# Patient Record
Sex: FEMALE | Race: WHITE | NOT HISPANIC OR LATINO | URBAN - METROPOLITAN AREA
[De-identification: names, ages, dates, MRNs, and addresses within clinical notes are randomized per-mention and may not be internally consistent; named-entity substitution may affect disease eponyms.]

---

## 2023-02-18 PROBLEM — D64.9 ANEMIA: Status: ACTIVE | Noted: 2023-02-18

## 2023-02-18 PROBLEM — R09.81 NASAL CONGESTION: Status: ACTIVE | Noted: 2023-02-18

## 2023-02-18 PROBLEM — R39.15 URINARY URGENCY: Status: ACTIVE | Noted: 2023-02-18

## 2023-02-18 PROBLEM — F41.9 ANXIETY: Status: ACTIVE | Noted: 2023-02-18

## 2023-02-18 PROBLEM — J01.90 ACUTE SINUSITIS: Status: ACTIVE | Noted: 2023-02-18

## 2023-02-18 PROBLEM — J20.9 ACUTE BRONCHITIS: Status: ACTIVE | Noted: 2023-02-18

## 2023-02-18 PROBLEM — K59.00 CONSTIPATION: Status: ACTIVE | Noted: 2023-02-18

## 2023-02-18 PROBLEM — R35.0 URINARY FREQUENCY: Status: ACTIVE | Noted: 2023-02-18

## 2023-02-18 PROBLEM — J02.9 SORE THROAT: Status: ACTIVE | Noted: 2023-02-18

## 2023-02-18 PROBLEM — N39.0 ACUTE UTI: Status: ACTIVE | Noted: 2023-02-18

## 2023-02-18 PROBLEM — B34.9 VIRAL ILLNESS: Status: ACTIVE | Noted: 2023-02-18

## 2023-02-18 PROBLEM — F32.9 MAJOR DEPRESSION, CHRONIC: Status: ACTIVE | Noted: 2023-02-18

## 2023-02-18 PROBLEM — E55.9 MILD VITAMIN D DEFICIENCY: Status: ACTIVE | Noted: 2023-02-18

## 2023-02-18 PROBLEM — F43.10 PTSD (POST-TRAUMATIC STRESS DISORDER): Status: ACTIVE | Noted: 2023-02-18

## 2023-02-18 PROBLEM — N94.6 DYSMENORRHEA: Status: ACTIVE | Noted: 2023-02-18

## 2023-02-18 RX ORDER — CITALOPRAM 40 MG/1
40 TABLET, FILM COATED ORAL DAILY
COMMUNITY
End: 2023-03-07 | Stop reason: SDUPTHER

## 2023-03-06 ASSESSMENT — ENCOUNTER SYMPTOMS
DIARRHEA: 0
HEMATURIA: 0
ABDOMINAL DISTENTION: 0
COLOR CHANGE: 0
FREQUENCY: 0
HEADACHES: 0
POLYPHAGIA: 0
EYE DISCHARGE: 0
POLYDIPSIA: 0
NAUSEA: 0
ABDOMINAL PAIN: 0
CHOKING: 0
MYALGIAS: 0
FACIAL SWELLING: 0
DIFFICULTY URINATING: 0
COUGH: 0
CHILLS: 0
APPETITE CHANGE: 0
ANAL BLEEDING: 0
NERVOUS/ANXIOUS: 0
CONFUSION: 0
PALPITATIONS: 0
SLEEP DISTURBANCE: 0
SORE THROAT: 0
JOINT SWELLING: 0
ARTHRALGIAS: 0
EYE PAIN: 0
TREMORS: 0
NUMBNESS: 0
DIZZINESS: 0
FATIGUE: 0
WEAKNESS: 0
VOMITING: 0
CHEST TIGHTNESS: 0
SHORTNESS OF BREATH: 0
WHEEZING: 0
FEVER: 0
CONSTIPATION: 0

## 2023-03-07 ENCOUNTER — APPOINTMENT (OUTPATIENT)
Dept: PRIMARY CARE | Facility: CLINIC | Age: 23
End: 2023-03-07
Payer: COMMERCIAL

## 2023-03-07 ENCOUNTER — TELEMEDICINE (OUTPATIENT)
Dept: PRIMARY CARE | Facility: CLINIC | Age: 23
End: 2023-03-07
Payer: COMMERCIAL

## 2023-03-07 DIAGNOSIS — F41.9 ANXIETY: ICD-10-CM

## 2023-03-07 DIAGNOSIS — F32.9 MAJOR DEPRESSION, CHRONIC: Primary | ICD-10-CM

## 2023-03-07 PROCEDURE — 99213 OFFICE O/P EST LOW 20 MIN: CPT | Performed by: PHYSICIAN ASSISTANT

## 2023-03-07 RX ORDER — CITALOPRAM 40 MG/1
40 TABLET, FILM COATED ORAL DAILY
Qty: 90 TABLET | Refills: 3 | Status: SHIPPED | OUTPATIENT
Start: 2023-03-07 | End: 2023-06-01 | Stop reason: ALTCHOICE

## 2023-03-07 RX ORDER — KETOCONAZOLE 20 MG/ML
SHAMPOO, SUSPENSION TOPICAL
COMMUNITY
Start: 2022-08-30

## 2023-03-07 RX ORDER — FERROUS SULFATE 325(65) MG
1 TABLET ORAL DAILY
COMMUNITY
Start: 2020-12-04

## 2023-03-07 NOTE — PATIENT INSTRUCTIONS
Anxiety, depression, PTSD  in remission  Failed buspirone due to drowsiness  Continue Celexa 40 mg once daily  Follow-up with psychology at [facility] in [place] once a week or twice a month      Anemia  Continue iron supplement     Off of birth control pill  Follow-up with your gynecologist as scheduled

## 2023-03-07 NOTE — PROGRESS NOTES
Subjective   Patient ID: Laura Riggins is a 22 y.o. female with a history of anxiety, depression, PTSD, and anemia who presents for follow up.     Virtual or Telephone Consent    An interactive audio and video telecommunication system which permits real time communications between the patient (at the originating site) and provider (at the distant site) was utilized to provide this telehealth service.   Verbal consent was requested and obtained from Laura Riggins on this date, 03/07/23 for a telehealth visit.         HPI The patient's depression is well controlled with citalopram.  Denies feeling down or sad.  Does not feel hopeless or helpless.  There is no suicidal or homicidal ideations.  Stated that she is doing a lot better since she is on citalopram 40 mg.  She follows with psychology once a week or twice a month depends on her schedule.  She continues iron supplement daily.  Denies shortness of breath or fatigue.    Review of Systems   Constitutional:  Negative for appetite change, chills, fatigue and fever.   HENT:  Negative for congestion, ear pain, facial swelling, hearing loss, nosebleeds and sore throat.    Eyes:  Negative for pain, discharge and visual disturbance.   Respiratory:  Negative for cough, choking, chest tightness, shortness of breath and wheezing.    Cardiovascular:  Negative for chest pain, palpitations and leg swelling.   Gastrointestinal:  Negative for abdominal distention, abdominal pain, anal bleeding, constipation, diarrhea, nausea and vomiting.   Endocrine: Negative for cold intolerance, heat intolerance, polydipsia, polyphagia and polyuria.   Genitourinary:  Negative for difficulty urinating, frequency, hematuria and urgency.   Musculoskeletal:  Negative for arthralgias, gait problem, joint swelling and myalgias.   Skin:  Negative for color change and rash.   Neurological:  Negative for dizziness, tremors, syncope, weakness, numbness and headaches.   Psychiatric/Behavioral:  Negative for  behavioral problems, confusion, sleep disturbance and suicidal ideas. The patient is not nervous/anxious.        Objective   There were no vitals taken for this visit.    Physical Exam  Constitutional:       General: She is not in acute distress.     Appearance: Normal appearance.   HENT:      Head: Normocephalic and atraumatic.      Nose: Nose normal.   Eyes:      Extraocular Movements: Extraocular movements intact.      Conjunctiva/sclera: Conjunctivae normal.      Pupils: Pupils are equal, round, and reactive to light.   Cardiovascular:      Rate and Rhythm: Normal rate and regular rhythm.      Pulses: Normal pulses.      Heart sounds: Normal heart sounds.   Pulmonary:      Effort: Pulmonary effort is normal.      Breath sounds: Normal breath sounds.   Abdominal:      General: Bowel sounds are normal.      Palpations: Abdomen is soft.   Musculoskeletal:         General: Normal range of motion.      Cervical back: Normal range of motion and neck supple.   Neurological:      General: No focal deficit present.      Mental Status: She is alert and oriented to person, place, and time.   Psychiatric:         Mood and Affect: Mood normal.         Behavior: Behavior normal.         Thought Content: Thought content normal.         Judgment: Judgment normal.       Assessment/Plan       Anxiety, depression, PTSD  in remission  Failed buspirone due to drowsiness  Continue Celexa 40 mg once daily  Follow-up with psychology at [facility] in [place] once a week or twice a month      Anemia  Continue iron supplement     Off of birth control pill  Follow-up with your gynecologist as scheduled    Follow-up in 6 months or sooner if needed

## 2023-05-11 ENCOUNTER — TELEMEDICINE (OUTPATIENT)
Dept: PRIMARY CARE | Facility: CLINIC | Age: 23
End: 2023-05-11
Payer: COMMERCIAL

## 2023-05-11 DIAGNOSIS — F41.9 ANXIETY: Primary | ICD-10-CM

## 2023-05-11 DIAGNOSIS — F32.9 MAJOR DEPRESSION, CHRONIC: ICD-10-CM

## 2023-05-11 PROCEDURE — 99213 OFFICE O/P EST LOW 20 MIN: CPT | Performed by: PHYSICIAN ASSISTANT

## 2023-05-11 RX ORDER — BUPROPION HYDROCHLORIDE 75 MG/1
75 TABLET ORAL 2 TIMES DAILY
Qty: 60 TABLET | Refills: 1 | Status: SHIPPED | OUTPATIENT
Start: 2023-05-11 | End: 2023-06-01 | Stop reason: ALTCHOICE

## 2023-05-11 ASSESSMENT — ENCOUNTER SYMPTOMS
DIARRHEA: 0
CONSTIPATION: 0
HEMATURIA: 0
JOINT SWELLING: 0
DIFFICULTY URINATING: 0
ABDOMINAL PAIN: 0
CONFUSION: 0
SLEEP DISTURBANCE: 0
NUMBNESS: 0
POLYDIPSIA: 0
APPETITE CHANGE: 0
ABDOMINAL DISTENTION: 0
NERVOUS/ANXIOUS: 0
ANAL BLEEDING: 0
CHOKING: 0
COLOR CHANGE: 0
PALPITATIONS: 0
ARTHRALGIAS: 0
FEVER: 0
CHEST TIGHTNESS: 0
TREMORS: 0
COUGH: 0
HEADACHES: 0
WEAKNESS: 0
POLYPHAGIA: 0
FREQUENCY: 0
CHILLS: 0
DIZZINESS: 0
VOMITING: 0
WHEEZING: 0
NAUSEA: 0
SHORTNESS OF BREATH: 0
FATIGUE: 0
SORE THROAT: 0
EYE DISCHARGE: 0
MYALGIAS: 0
EYE PAIN: 0
FACIAL SWELLING: 0

## 2023-05-11 NOTE — PROGRESS NOTES
Subjective   Patient ID: Laura Riggins is a 22 y.o. female with a history of anxiety, depression, PTSD, and anemia complaining of  weight gain.    Virtual or Telephone Consent    An interactive audio and video telecommunication system which permits real time communications between the patient (at the originating site) and provider (at the distant site) was utilized to provide this telehealth service.   Verbal consent was requested and obtained from Laura Riggins on this date, 05/11/23 for a telehealth visit.         HPI The patient is complaining of weight gain since citalopram was increased to 40 mg.  She tries to eat healthy and exercises but unable to lose weight.  Her mood is stable.  No anxiety.  Sleeps well through the night.    Review of Systems   Constitutional:  Negative for appetite change, chills, fatigue and fever.   HENT:  Negative for congestion, ear pain, facial swelling, hearing loss, nosebleeds and sore throat.    Eyes:  Negative for pain, discharge and visual disturbance.   Respiratory:  Negative for cough, choking, chest tightness, shortness of breath and wheezing.    Cardiovascular:  Negative for chest pain, palpitations and leg swelling.   Gastrointestinal:  Negative for abdominal distention, abdominal pain, anal bleeding, constipation, diarrhea, nausea and vomiting.   Endocrine: Negative for cold intolerance, heat intolerance, polydipsia, polyphagia and polyuria.   Genitourinary:  Negative for difficulty urinating, frequency, hematuria and urgency.   Musculoskeletal:  Negative for arthralgias, gait problem, joint swelling and myalgias.   Skin:  Negative for color change and rash.   Neurological:  Negative for dizziness, tremors, syncope, weakness, numbness and headaches.   Psychiatric/Behavioral:  Negative for behavioral problems, confusion, sleep disturbance and suicidal ideas. The patient is not nervous/anxious.        Objective   There were no vitals taken for this visit.    Physical  Exam    Assessment/Plan     Weight gain  Due to Celexa  We will taper Celexa and start on bupropion 75 mg twice daily  Take Celexa 40 mg half a pill for about 10 days then decrease to either half a pill every other day or a quarter of a pill daily  Reviewed side effects of medications  Patient voiced full understanding of side effects  call if any side effects  Follow-up in 3 weeks    Anxiety, depression, PTSD  in remission  Failed buspirone due to drowsiness  We will taper Celexa and start on bupropion 75 mg twice daily due to weight gain from Celexa  Take Celexa 40 mg half a pill for about 10 days then decrease to either half a pill every other day or a quarter of a pill daily  Follow-up with psychology at [facility] in [place] twice a month      Anemia  Continue iron supplement     Off of birth control pill  Follow-up with your gynecologist as scheduled    Follow-up in 3 weeks

## 2023-05-11 NOTE — PATIENT INSTRUCTIONS
Weight gain  Due to Celexa  We will taper Celexa and start on bupropion 75 mg twice daily  Take Celexa 40 mg half a pill for about 10 days then decrease to either half a pill every other day or a quarter of a pill daily  Reviewed side effects of medications  Patient voiced full understanding of side effects  call if any side effects  Follow-up in 3 weeks    Anxiety, depression, PTSD  in remission  Failed buspirone due to drowsiness  We will taper Celexa and start on bupropion 75 mg twice daily due to weight gain from Celexa  Take Celexa 40 mg half a pill for about 10 days then decrease to either half a pill every other day or a quarter of a pill daily  Follow-up with psychology at [facility] in [place] twice a month

## 2023-05-19 RX ORDER — TRETINOIN 0.5 MG/G
1 CREAM TOPICAL
COMMUNITY
Start: 2017-07-02

## 2023-05-19 RX ORDER — CLINDAMYCIN PHOSPHATE 10 UG/ML
1 LOTION TOPICAL
COMMUNITY
Start: 2017-07-26

## 2023-05-19 RX ORDER — CITALOPRAM 20 MG/1
20 TABLET, FILM COATED ORAL
COMMUNITY
Start: 2022-12-21 | End: 2023-06-01 | Stop reason: ALTCHOICE

## 2023-06-01 ENCOUNTER — TELEMEDICINE (OUTPATIENT)
Dept: PRIMARY CARE | Facility: CLINIC | Age: 23
End: 2023-06-01
Payer: COMMERCIAL

## 2023-06-01 DIAGNOSIS — R63.5 WEIGHT GAIN: ICD-10-CM

## 2023-06-01 DIAGNOSIS — F41.9 ANXIETY: Primary | ICD-10-CM

## 2023-06-01 PROCEDURE — 99213 OFFICE O/P EST LOW 20 MIN: CPT | Performed by: PHYSICIAN ASSISTANT

## 2023-06-01 RX ORDER — BUPROPION HYDROCHLORIDE 150 MG/1
150 TABLET, EXTENDED RELEASE ORAL 2 TIMES DAILY
Qty: 180 TABLET | Refills: 3 | Status: SHIPPED | OUTPATIENT
Start: 2023-06-01 | End: 2024-05-31

## 2023-06-01 ASSESSMENT — ENCOUNTER SYMPTOMS
FATIGUE: 0
PALPITATIONS: 0
EYE PAIN: 0
POLYDIPSIA: 0
FEVER: 0
ABDOMINAL DISTENTION: 0
COLOR CHANGE: 0
ARTHRALGIAS: 0
FACIAL SWELLING: 0
HEADACHES: 0
ABDOMINAL PAIN: 0
DIFFICULTY URINATING: 0
WHEEZING: 0
CHEST TIGHTNESS: 0
VOMITING: 0
JOINT SWELLING: 0
APPETITE CHANGE: 0
DIZZINESS: 0
CONSTIPATION: 0
ANAL BLEEDING: 0
WEAKNESS: 0
FREQUENCY: 0
NAUSEA: 0
COUGH: 0
TREMORS: 0
SHORTNESS OF BREATH: 0
MYALGIAS: 0
POLYPHAGIA: 0
CHILLS: 0
EYE DISCHARGE: 0
SORE THROAT: 0
DIARRHEA: 0
CHOKING: 0
SLEEP DISTURBANCE: 0
NERVOUS/ANXIOUS: 0
CONFUSION: 0
HEMATURIA: 0
NUMBNESS: 0

## 2023-06-01 NOTE — PROGRESS NOTES
Subjective   Patient ID: Laura Riggins is a 22 y.o. female with a history of anxiety, depression, PTSD, and anemia who presents for follow up.     Virtual or Telephone Consent    An interactive audio and video telecommunication system which permits real time communications between the patient (at the originating site) and provider (at the distant site) was utilized to provide this telehealth service.   Verbal consent was requested and obtained from Laura Riggins on this date, 06/01/23 for a telehealth visit.     HPI The patient tapered Celexa and titrated bupropion SR 75 mg twice daily to 150 twice daily and stated she is doing very well.  Her anxiety is stable.  No depression.  She tolerates medication well.    Review of Systems   Constitutional:  Negative for appetite change, chills, fatigue and fever.   HENT:  Negative for congestion, ear pain, facial swelling, hearing loss, nosebleeds and sore throat.    Eyes:  Negative for pain, discharge and visual disturbance.   Respiratory:  Negative for cough, choking, chest tightness, shortness of breath and wheezing.    Cardiovascular:  Negative for chest pain, palpitations and leg swelling.   Gastrointestinal:  Negative for abdominal distention, abdominal pain, anal bleeding, constipation, diarrhea, nausea and vomiting.   Endocrine: Negative for cold intolerance, heat intolerance, polydipsia, polyphagia and polyuria.   Genitourinary:  Negative for difficulty urinating, frequency, hematuria and urgency.   Musculoskeletal:  Negative for arthralgias, gait problem, joint swelling and myalgias.   Skin:  Negative for color change and rash.   Neurological:  Negative for dizziness, tremors, syncope, weakness, numbness and headaches.   Psychiatric/Behavioral:  Negative for behavioral problems, confusion, sleep disturbance and suicidal ideas. The patient is not nervous/anxious.        Objective   There were no vitals taken for this visit.    Physical Exam    Assessment/Plan   Weight  gain due to Celexa  Off of Celexa  Try to be on low fat, low cholesterol diet, low carbohydrate diet  Exercise at least 30 minutes daily     Anxiety, depression, PTSD  Failed buspirone due to drowsiness  Developed weight gain from Celexa  Off of Celexa   Titrated bupropion 75 mg twice daily to 150 twice daily  Doing well  Continue bupropion  mg twice daily  Follow-up with psychology at [facility] in [place] twice a month      Anemia  Continue iron supplement     Off of birth control pill  Follow-up with your gynecologist as scheduled

## 2023-07-25 ASSESSMENT — ENCOUNTER SYMPTOMS
EYE PAIN: 0
VOMITING: 0
DIARRHEA: 0
HEMATURIA: 0
CONFUSION: 0
ARTHRALGIAS: 0
APPETITE CHANGE: 0
NUMBNESS: 0
FEVER: 0
COUGH: 0
POLYDIPSIA: 0
WHEEZING: 0
FREQUENCY: 0
FACIAL SWELLING: 0
CHEST TIGHTNESS: 0
FATIGUE: 0
HEADACHES: 0
DIZZINESS: 0
CHOKING: 0
MYALGIAS: 0
NERVOUS/ANXIOUS: 0
EYE DISCHARGE: 0
WEAKNESS: 0
SORE THROAT: 0
POLYPHAGIA: 0
ABDOMINAL PAIN: 0
PALPITATIONS: 0
DIFFICULTY URINATING: 0
NAUSEA: 0
COLOR CHANGE: 0
CHILLS: 0
SHORTNESS OF BREATH: 0
SLEEP DISTURBANCE: 0
JOINT SWELLING: 0
TREMORS: 0
ABDOMINAL DISTENTION: 0
ANAL BLEEDING: 0
CONSTIPATION: 0

## 2023-07-25 NOTE — PROGRESS NOTES
"Subjective   Patient ID: Laura Riggins is a 23 y.o. female with a history of anxiety, depression, PTSD, and anemia who presents with complaints of hair loss.    HPI The patient is currently on bupropion 150 mg twice daily and stated she is doing a lot better.  She is not gaining weight anymore.  She tries to cope with depression by meditation and exercising.  She exercises daily by lifting and stretching.    Today she is complaining of loss of hair which is getting worse in the past 2 weeks.    Review of Systems   Constitutional:  Negative for appetite change, chills, fatigue and fever.   HENT:  Negative for congestion, ear pain, facial swelling, hearing loss, nosebleeds and sore throat.    Eyes:  Negative for pain, discharge and visual disturbance.   Respiratory:  Negative for cough, choking, chest tightness, shortness of breath and wheezing.    Cardiovascular:  Negative for chest pain, palpitations and leg swelling.   Gastrointestinal:  Negative for abdominal distention, abdominal pain, anal bleeding, constipation, diarrhea, nausea and vomiting.   Endocrine: Negative for cold intolerance, heat intolerance, polydipsia, polyphagia and polyuria.   Genitourinary:  Negative for difficulty urinating, frequency, hematuria and urgency.   Musculoskeletal:  Negative for arthralgias, gait problem, joint swelling and myalgias.   Skin:  Negative for color change and rash.        Hair loss   Neurological:  Negative for dizziness, tremors, syncope, weakness, numbness and headaches.   Psychiatric/Behavioral:  Negative for behavioral problems, confusion, sleep disturbance and suicidal ideas. The patient is not nervous/anxious.        Objective   BP 90/60   Temp 36.7 °C (98.1 °F)   Ht 1.676 m (5' 6\")   Wt 75.3 kg (166 lb)   LMP 06/21/2023   BMI 26.79 kg/m²     Physical Exam  Constitutional:       General: She is not in acute distress.     Appearance: Normal appearance.   HENT:      Head: Normocephalic and atraumatic.      Nose: " Nose normal.   Eyes:      Extraocular Movements: Extraocular movements intact.      Conjunctiva/sclera: Conjunctivae normal.      Pupils: Pupils are equal, round, and reactive to light.   Cardiovascular:      Rate and Rhythm: Normal rate and regular rhythm.      Pulses: Normal pulses.      Heart sounds: Normal heart sounds.   Pulmonary:      Effort: Pulmonary effort is normal.      Breath sounds: Normal breath sounds.   Abdominal:      General: Bowel sounds are normal.      Palpations: Abdomen is soft.   Musculoskeletal:         General: Normal range of motion.      Cervical back: Normal range of motion and neck supple.   Neurological:      General: No focal deficit present.      Mental Status: She is alert and oriented to person, place, and time.   Psychiatric:         Mood and Affect: Mood normal.         Behavior: Behavior normal.         Thought Content: Thought content normal.         Judgment: Judgment normal.         Assessment/Plan   Hair loss  We obtained blood work including DHEA, FSH, LH, MONISHA  Buy OTC biotin and take it daily    Weight gain due to Celexa  Off of Celexa  Try to be on low fat, low cholesterol diet, low carbohydrate diet  Exercise at least 30 minutes daily     Anxiety, depression, PTSD  Failed buspirone due to drowsiness  Developed weight gain from Celexa  Currently on bupropion 150 mg twice daily  Doing much better  Follow-up with psychology at [facility] in [place] twice a month      Anemia  Continue iron supplement daily  We obtained iron studies today     Off of birth control pill  Follow-up with your gynecologist as scheduled    I will call with results  Follow-up pain 3-6 months

## 2023-07-27 ENCOUNTER — OFFICE VISIT (OUTPATIENT)
Dept: PRIMARY CARE | Facility: CLINIC | Age: 23
End: 2023-07-27
Payer: COMMERCIAL

## 2023-07-27 VITALS
TEMPERATURE: 98.1 F | DIASTOLIC BLOOD PRESSURE: 60 MMHG | HEIGHT: 66 IN | WEIGHT: 166 LBS | SYSTOLIC BLOOD PRESSURE: 90 MMHG | BODY MASS INDEX: 26.68 KG/M2

## 2023-07-27 DIAGNOSIS — D50.8 OTHER IRON DEFICIENCY ANEMIA: ICD-10-CM

## 2023-07-27 DIAGNOSIS — L65.9 HAIR LOSS: Primary | ICD-10-CM

## 2023-07-27 DIAGNOSIS — N94.6 DYSMENORRHEA: ICD-10-CM

## 2023-07-27 DIAGNOSIS — F32.9 MAJOR DEPRESSION, CHRONIC: ICD-10-CM

## 2023-07-27 DIAGNOSIS — E55.9 MILD VITAMIN D DEFICIENCY: ICD-10-CM

## 2023-07-27 DIAGNOSIS — E78.5 DYSLIPIDEMIA: ICD-10-CM

## 2023-07-27 LAB
ANTI-CENTROMERE: <0.2 AI
ANTI-CHROMATIN: <0.2 AI
ANTI-DNA (DS): <1 IU/ML
ANTI-JO-1 IGG: <0.2 AI
ANTI-RIBOSOMAL P: <0.2 AI
ANTI-RNP: 0.3 AI
ANTI-SCL-70: <0.2 AI
ANTI-SM/RNP: <0.2 AI
ANTI-SM: <0.2 AI
ANTI-SSA: <0.2 AI
ANTI-SSB: 0.8 AI
FERRITIN (UG/LL) IN SER/PLAS: 24 UG/L (ref 8–150)
FOLLITROPIN (IU/L) IN SER/PLAS: 7.2 IU/L
IRON (UG/DL) IN SER/PLAS: 125 UG/DL (ref 35–150)
IRON BINDING CAPACITY (UG/DL) IN SER/PLAS: 379 UG/DL (ref 240–445)
IRON SATURATION (%) IN SER/PLAS: 33 % (ref 25–45)
LUTEINIZING HORMONE (IU/ML) IN SER/PLAS: 25.3 IU/L

## 2023-07-27 PROCEDURE — 85025 COMPLETE CBC W/AUTO DIFF WBC: CPT | Performed by: PHYSICIAN ASSISTANT

## 2023-07-27 PROCEDURE — 86235 NUCLEAR ANTIGEN ANTIBODY: CPT

## 2023-07-27 PROCEDURE — 80061 LIPID PANEL: CPT | Performed by: PHYSICIAN ASSISTANT

## 2023-07-27 PROCEDURE — 82728 ASSAY OF FERRITIN: CPT

## 2023-07-27 PROCEDURE — 84443 ASSAY THYROID STIM HORMONE: CPT | Performed by: PHYSICIAN ASSISTANT

## 2023-07-27 PROCEDURE — 86038 ANTINUCLEAR ANTIBODIES: CPT

## 2023-07-27 PROCEDURE — 82306 VITAMIN D 25 HYDROXY: CPT | Performed by: PHYSICIAN ASSISTANT

## 2023-07-27 PROCEDURE — 83002 ASSAY OF GONADOTROPIN (LH): CPT

## 2023-07-27 PROCEDURE — 83540 ASSAY OF IRON: CPT

## 2023-07-27 PROCEDURE — 82626 DEHYDROEPIANDROSTERONE: CPT

## 2023-07-27 PROCEDURE — 99214 OFFICE O/P EST MOD 30 MIN: CPT | Performed by: PHYSICIAN ASSISTANT

## 2023-07-27 PROCEDURE — 1036F TOBACCO NON-USER: CPT | Performed by: PHYSICIAN ASSISTANT

## 2023-07-27 PROCEDURE — 86225 DNA ANTIBODY NATIVE: CPT

## 2023-07-27 PROCEDURE — 82607 VITAMIN B-12: CPT | Performed by: PHYSICIAN ASSISTANT

## 2023-07-27 PROCEDURE — 80053 COMPREHEN METABOLIC PANEL: CPT | Performed by: PHYSICIAN ASSISTANT

## 2023-07-27 PROCEDURE — 83001 ASSAY OF GONADOTROPIN (FSH): CPT

## 2023-07-27 PROCEDURE — 83550 IRON BINDING TEST: CPT

## 2023-07-27 ASSESSMENT — COLUMBIA-SUICIDE SEVERITY RATING SCALE - C-SSRS
1. IN THE PAST MONTH, HAVE YOU WISHED YOU WERE DEAD OR WISHED YOU COULD GO TO SLEEP AND NOT WAKE UP?: NO
6. HAVE YOU EVER DONE ANYTHING, STARTED TO DO ANYTHING, OR PREPARED TO DO ANYTHING TO END YOUR LIFE?: NO
2. HAVE YOU ACTUALLY HAD ANY THOUGHTS OF KILLING YOURSELF?: NO

## 2023-07-27 ASSESSMENT — PATIENT HEALTH QUESTIONNAIRE - PHQ9
2. FEELING DOWN, DEPRESSED OR HOPELESS: NOT AT ALL
SUM OF ALL RESPONSES TO PHQ9 QUESTIONS 1 AND 2: 0
1. LITTLE INTEREST OR PLEASURE IN DOING THINGS: NOT AT ALL

## 2023-07-27 ASSESSMENT — ENCOUNTER SYMPTOMS
ROS SKIN COMMENTS: HAIR LOSS
OCCASIONAL FEELINGS OF UNSTEADINESS: 0
DEPRESSION: 0
LOSS OF SENSATION IN FEET: 0

## 2023-07-28 LAB — ANTI-NUCLEAR ANTIBODY (ANA): NEGATIVE

## 2023-08-01 LAB — DEHYDROEPIANDROSTERONE (DHEA) (NG/ML) IN SER/PLAS: 6.4 NG/ML (ref 1.33–7.78)

## 2024-06-13 ENCOUNTER — TELEPHONE (OUTPATIENT)
Dept: PRIMARY CARE | Facility: CLINIC | Age: 24
End: 2024-06-13
Payer: COMMERCIAL

## 2024-06-13 DIAGNOSIS — F41.9 ANXIETY: ICD-10-CM

## 2024-06-13 RX ORDER — BUPROPION HYDROCHLORIDE 150 MG/1
150 TABLET, EXTENDED RELEASE ORAL 2 TIMES DAILY
Qty: 60 TABLET | Refills: 0 | Status: SHIPPED | OUTPATIENT
Start: 2024-06-13 | End: 2025-06-13

## 2024-06-24 RX ORDER — TRIAZOLAM 0.12 MG/1
TABLET ORAL
COMMUNITY
Start: 2023-12-08 | End: 2024-06-25 | Stop reason: ALTCHOICE

## 2024-06-24 RX ORDER — CHOLECALCIFEROL (VITAMIN D3) 50 MCG
2000 TABLET ORAL
COMMUNITY

## 2024-06-24 RX ORDER — ACETAMINOPHEN 500 MG
1000 TABLET ORAL EVERY 6 HOURS PRN
COMMUNITY

## 2024-06-24 RX ORDER — AMOXICILLIN AND CLAVULANATE POTASSIUM 875; 125 MG/1; MG/1
1 TABLET, FILM COATED ORAL
COMMUNITY
Start: 2023-12-26 | End: 2024-06-25 | Stop reason: ALTCHOICE

## 2024-06-24 RX ORDER — CHLORHEXIDINE GLUCONATE ORAL RINSE 1.2 MG/ML
SOLUTION DENTAL
COMMUNITY
Start: 2023-12-08 | End: 2024-06-25 | Stop reason: ALTCHOICE

## 2024-06-24 RX ORDER — SULFAMETHOXAZOLE AND TRIMETHOPRIM 800; 160 MG/1; MG/1
1 TABLET ORAL
COMMUNITY
Start: 2023-09-11 | End: 2024-06-25 | Stop reason: ALTCHOICE

## 2024-06-24 RX ORDER — AMOXICILLIN AND CLAVULANATE POTASSIUM 600; 42.9 MG/5ML; MG/5ML
POWDER, FOR SUSPENSION ORAL
COMMUNITY
Start: 2023-12-14 | End: 2024-06-25 | Stop reason: ALTCHOICE

## 2024-06-24 RX ORDER — OXYCODONE HCL 5 MG/5 ML
SOLUTION, ORAL ORAL
COMMUNITY
Start: 2023-12-08 | End: 2024-06-25 | Stop reason: ALTCHOICE

## 2024-06-25 ENCOUNTER — APPOINTMENT (OUTPATIENT)
Dept: PRIMARY CARE | Facility: CLINIC | Age: 24
End: 2024-06-25
Payer: COMMERCIAL

## 2024-06-25 VITALS
SYSTOLIC BLOOD PRESSURE: 94 MMHG | WEIGHT: 141 LBS | HEIGHT: 66 IN | BODY MASS INDEX: 22.66 KG/M2 | DIASTOLIC BLOOD PRESSURE: 60 MMHG

## 2024-06-25 DIAGNOSIS — D50.9 IRON DEFICIENCY ANEMIA, UNSPECIFIED IRON DEFICIENCY ANEMIA TYPE: ICD-10-CM

## 2024-06-25 DIAGNOSIS — F32.9 MAJOR DEPRESSION, CHRONIC: Primary | ICD-10-CM

## 2024-06-25 DIAGNOSIS — F41.9 ANXIETY: ICD-10-CM

## 2024-06-25 PROBLEM — K59.00 CONSTIPATION: Status: RESOLVED | Noted: 2023-02-18 | Resolved: 2024-06-25

## 2024-06-25 PROBLEM — N39.0 ACUTE UTI: Status: RESOLVED | Noted: 2023-02-18 | Resolved: 2024-06-25

## 2024-06-25 PROBLEM — R39.15 URINARY URGENCY: Status: RESOLVED | Noted: 2023-02-18 | Resolved: 2024-06-25

## 2024-06-25 PROBLEM — M26.01 MAXILLARY HYPERPLASIA: Status: ACTIVE | Noted: 2023-12-12

## 2024-06-25 PROBLEM — J20.9 ACUTE BRONCHITIS: Status: RESOLVED | Noted: 2023-02-18 | Resolved: 2024-06-25

## 2024-06-25 PROBLEM — J01.90 ACUTE SINUSITIS: Status: RESOLVED | Noted: 2023-02-18 | Resolved: 2024-06-25

## 2024-06-25 PROBLEM — J02.9 SORE THROAT: Status: RESOLVED | Noted: 2023-02-18 | Resolved: 2024-06-25

## 2024-06-25 PROBLEM — R35.0 URINARY FREQUENCY: Status: RESOLVED | Noted: 2023-02-18 | Resolved: 2024-06-25

## 2024-06-25 PROBLEM — B34.9 VIRAL ILLNESS: Status: RESOLVED | Noted: 2023-02-18 | Resolved: 2024-06-25

## 2024-06-25 PROCEDURE — 85025 COMPLETE CBC W/AUTO DIFF WBC: CPT | Performed by: PHYSICIAN ASSISTANT

## 2024-06-25 PROCEDURE — 83540 ASSAY OF IRON: CPT

## 2024-06-25 PROCEDURE — 36415 COLL VENOUS BLD VENIPUNCTURE: CPT

## 2024-06-25 PROCEDURE — 99214 OFFICE O/P EST MOD 30 MIN: CPT | Performed by: PHYSICIAN ASSISTANT

## 2024-06-25 PROCEDURE — 83550 IRON BINDING TEST: CPT

## 2024-06-25 PROCEDURE — 82728 ASSAY OF FERRITIN: CPT

## 2024-06-25 PROCEDURE — 1036F TOBACCO NON-USER: CPT | Performed by: PHYSICIAN ASSISTANT

## 2024-06-25 RX ORDER — BUPROPION HYDROCHLORIDE 150 MG/1
150 TABLET, EXTENDED RELEASE ORAL 2 TIMES DAILY
Qty: 180 TABLET | Refills: 3 | Status: SHIPPED | OUTPATIENT
Start: 2024-06-25 | End: 2025-06-20

## 2024-06-25 ASSESSMENT — ENCOUNTER SYMPTOMS
PALPITATIONS: 0
ROS SKIN COMMENTS: HAIR LOSS
SHORTNESS OF BREATH: 0
DIZZINESS: 0
HEADACHES: 0
ABDOMINAL PAIN: 0
ANAL BLEEDING: 0
COLOR CHANGE: 0
FATIGUE: 0
EYE DISCHARGE: 0
WEAKNESS: 0
HEMATURIA: 0
CHILLS: 0
CHEST TIGHTNESS: 0
POLYPHAGIA: 0
ABDOMINAL DISTENTION: 0
POLYDIPSIA: 0
SLEEP DISTURBANCE: 0
DIFFICULTY URINATING: 0
WHEEZING: 0
NERVOUS/ANXIOUS: 0
CHOKING: 0
FREQUENCY: 0
LOSS OF SENSATION IN FEET: 0
DEPRESSION: 0
FEVER: 0
APPETITE CHANGE: 0
ARTHRALGIAS: 0
CONSTIPATION: 0
EYE PAIN: 0
VOMITING: 0
NUMBNESS: 0
COUGH: 0
JOINT SWELLING: 0
MYALGIAS: 0
FACIAL SWELLING: 0
OCCASIONAL FEELINGS OF UNSTEADINESS: 0
TREMORS: 0
CONFUSION: 0
NAUSEA: 0
DIARRHEA: 0
SORE THROAT: 0

## 2024-06-25 ASSESSMENT — COLUMBIA-SUICIDE SEVERITY RATING SCALE - C-SSRS
1. IN THE PAST MONTH, HAVE YOU WISHED YOU WERE DEAD OR WISHED YOU COULD GO TO SLEEP AND NOT WAKE UP?: NO
2. HAVE YOU ACTUALLY HAD ANY THOUGHTS OF KILLING YOURSELF?: NO
6. HAVE YOU EVER DONE ANYTHING, STARTED TO DO ANYTHING, OR PREPARED TO DO ANYTHING TO END YOUR LIFE?: NO

## 2024-06-25 ASSESSMENT — PAIN SCALES - GENERAL: PAINLEVEL: 0-NO PAIN

## 2024-06-25 ASSESSMENT — PATIENT HEALTH QUESTIONNAIRE - PHQ9
2. FEELING DOWN, DEPRESSED OR HOPELESS: NOT AT ALL
1. LITTLE INTEREST OR PLEASURE IN DOING THINGS: NOT AT ALL
SUM OF ALL RESPONSES TO PHQ9 QUESTIONS 1 AND 2: 0

## 2024-06-25 NOTE — PROGRESS NOTES
"Subjective   Patient ID: Laura Riggins is a 23 y.o. female with a history of anxiety, depression, PTSD, and anemia who presents with complaints of hair loss.    HPI The patient is presented for follow-up.  Her mood is stable.  She sleeps well through the night. She had jaw surgery in December and is very happy with the results.  She is able to chew her food better.  She is off of iron supplement for almost a year.    Review of Systems   Constitutional:  Negative for appetite change, chills, fatigue and fever.   HENT:  Negative for congestion, ear pain, facial swelling, hearing loss, nosebleeds and sore throat.    Eyes:  Negative for pain, discharge and visual disturbance.   Respiratory:  Negative for cough, choking, chest tightness, shortness of breath and wheezing.    Cardiovascular:  Negative for chest pain, palpitations and leg swelling.   Gastrointestinal:  Negative for abdominal distention, abdominal pain, anal bleeding, constipation, diarrhea, nausea and vomiting.   Endocrine: Negative for cold intolerance, heat intolerance, polydipsia, polyphagia and polyuria.   Genitourinary:  Negative for difficulty urinating, frequency, hematuria and urgency.   Musculoskeletal:  Negative for arthralgias, gait problem, joint swelling and myalgias.   Skin:  Negative for color change and rash.        Hair loss   Neurological:  Negative for dizziness, tremors, syncope, weakness, numbness and headaches.   Psychiatric/Behavioral:  Negative for behavioral problems, confusion, sleep disturbance and suicidal ideas. The patient is not nervous/anxious.        Objective   BP 94/60   Ht 1.676 m (5' 6\")   Wt 64 kg (141 lb)   LMP 06/03/2024   Breastfeeding No   BMI 22.76 kg/m²     Physical Exam  Constitutional:       General: She is not in acute distress.     Appearance: Normal appearance.   HENT:      Head: Normocephalic and atraumatic.      Nose: Nose normal.   Eyes:      Extraocular Movements: Extraocular movements intact.      " Conjunctiva/sclera: Conjunctivae normal.      Pupils: Pupils are equal, round, and reactive to light.   Cardiovascular:      Rate and Rhythm: Normal rate and regular rhythm.      Pulses: Normal pulses.      Heart sounds: Normal heart sounds.   Pulmonary:      Effort: Pulmonary effort is normal.      Breath sounds: Normal breath sounds.   Abdominal:      General: Bowel sounds are normal.      Palpations: Abdomen is soft.   Musculoskeletal:         General: Normal range of motion.      Cervical back: Normal range of motion and neck supple.   Neurological:      General: No focal deficit present.      Mental Status: She is alert and oriented to person, place, and time.   Psychiatric:         Mood and Affect: Mood normal.         Behavior: Behavior normal.         Thought Content: Thought content normal.         Judgment: Judgment normal.         Assessment/Plan   Anxiety, depression, PTSD  Stable  Failed buspirone due to drowsiness  Failed Celexa due to weight gain  Doing well on bupropion  Continue bupropion 150 mg twice daily  Follow-up with psychology at [facility] in [place] twice a month     Hair loss  Normal blood work  Improving  Take biotin daily    Anemia  Improved  Off of iron supplement  Will obtain CBC and iron studies today     Follow-up with gynecologist as scheduled    Follow-up pain 6 months or sooner if needed

## 2024-06-26 ENCOUNTER — TELEPHONE (OUTPATIENT)
Dept: PRIMARY CARE | Facility: CLINIC | Age: 24
End: 2024-06-26
Payer: COMMERCIAL

## 2024-06-26 LAB
FERRITIN SERPL-MCNC: 30 NG/ML (ref 8–150)
IRON SATN MFR SERPL: 36 % (ref 25–45)
IRON SERPL-MCNC: 141 UG/DL (ref 35–150)
TIBC SERPL-MCNC: 389 UG/DL (ref 240–445)
UIBC SERPL-MCNC: 248 UG/DL (ref 110–370)

## 2024-06-26 NOTE — TELEPHONE ENCOUNTER
----- Message from Medina Sykes PA-C sent at 6/26/2024  1:26 PM EDT -----  Please call her with blood test results.  Her hemoglobin is 12.94 and ferritin and iron are within normal limits.  She does not need to take iron supplement.

## 2024-09-06 ENCOUNTER — APPOINTMENT (OUTPATIENT)
Dept: OBSTETRICS AND GYNECOLOGY | Facility: CLINIC | Age: 24
End: 2024-09-06
Payer: COMMERCIAL

## 2024-12-17 DIAGNOSIS — F41.9 ANXIETY: ICD-10-CM

## 2024-12-17 RX ORDER — BUPROPION HYDROCHLORIDE 150 MG/1
150 TABLET, EXTENDED RELEASE ORAL 2 TIMES DAILY
Qty: 180 TABLET | Refills: 2 | Status: SHIPPED | OUTPATIENT
Start: 2024-12-17 | End: 2024-12-18 | Stop reason: SDUPTHER

## 2024-12-18 DIAGNOSIS — F41.9 ANXIETY: ICD-10-CM

## 2024-12-19 RX ORDER — BUPROPION HYDROCHLORIDE 150 MG/1
150 TABLET, EXTENDED RELEASE ORAL 2 TIMES DAILY
Qty: 180 TABLET | Refills: 1 | Status: SHIPPED | OUTPATIENT
Start: 2024-12-19 | End: 2025-12-14

## 2024-12-23 DIAGNOSIS — F41.9 ANXIETY: ICD-10-CM

## 2024-12-23 RX ORDER — BUPROPION HYDROCHLORIDE 150 MG/1
150 TABLET, EXTENDED RELEASE ORAL 2 TIMES DAILY
Qty: 60 TABLET | Refills: 2 | Status: SHIPPED | OUTPATIENT
Start: 2024-12-23 | End: 2025-12-18

## 2024-12-27 ENCOUNTER — APPOINTMENT (OUTPATIENT)
Dept: PRIMARY CARE | Facility: CLINIC | Age: 24
End: 2024-12-27
Payer: COMMERCIAL

## 2025-02-03 ENCOUNTER — APPOINTMENT (OUTPATIENT)
Dept: PRIMARY CARE | Facility: CLINIC | Age: 25
End: 2025-02-03
Payer: COMMERCIAL

## 2025-02-03 VITALS
TEMPERATURE: 98.2 F | BODY MASS INDEX: 23.3 KG/M2 | WEIGHT: 145 LBS | SYSTOLIC BLOOD PRESSURE: 90 MMHG | HEIGHT: 66 IN | DIASTOLIC BLOOD PRESSURE: 60 MMHG

## 2025-02-03 DIAGNOSIS — F41.9 ANXIETY: ICD-10-CM

## 2025-02-03 DIAGNOSIS — Z00.00 ROUTINE ADULT HEALTH MAINTENANCE: Primary | ICD-10-CM

## 2025-02-03 PROCEDURE — 1036F TOBACCO NON-USER: CPT | Performed by: PHYSICIAN ASSISTANT

## 2025-02-03 PROCEDURE — 3008F BODY MASS INDEX DOCD: CPT | Performed by: PHYSICIAN ASSISTANT

## 2025-02-03 PROCEDURE — 99395 PREV VISIT EST AGE 18-39: CPT | Performed by: PHYSICIAN ASSISTANT

## 2025-02-03 RX ORDER — BUPROPION HYDROCHLORIDE 150 MG/1
150 TABLET, EXTENDED RELEASE ORAL 2 TIMES DAILY
Qty: 180 TABLET | Refills: 3 | Status: SHIPPED | OUTPATIENT
Start: 2025-02-03 | End: 2026-01-29

## 2025-02-03 ASSESSMENT — ENCOUNTER SYMPTOMS
FACIAL SWELLING: 0
APPETITE CHANGE: 0
DEPRESSION: 0
TREMORS: 0
CHILLS: 0
POLYPHAGIA: 0
OCCASIONAL FEELINGS OF UNSTEADINESS: 0
FATIGUE: 0
WHEEZING: 0
ABDOMINAL DISTENTION: 0
DIZZINESS: 0
WEAKNESS: 0
CHEST TIGHTNESS: 0
VOMITING: 0
JOINT SWELLING: 0
CONFUSION: 0
POLYDIPSIA: 0
ARTHRALGIAS: 0
HEADACHES: 0
SLEEP DISTURBANCE: 0
CONSTIPATION: 0
MYALGIAS: 0
NERVOUS/ANXIOUS: 0
ANAL BLEEDING: 0
PALPITATIONS: 0
DIARRHEA: 0
EYE DISCHARGE: 0
DIFFICULTY URINATING: 0
LOSS OF SENSATION IN FEET: 0
COUGH: 0
CHOKING: 0
FREQUENCY: 0
COLOR CHANGE: 0
FEVER: 0
SORE THROAT: 0
NUMBNESS: 0
SHORTNESS OF BREATH: 0
ABDOMINAL PAIN: 0
HEMATURIA: 0
EYE PAIN: 0
NAUSEA: 0

## 2025-02-03 ASSESSMENT — COLUMBIA-SUICIDE SEVERITY RATING SCALE - C-SSRS
6. HAVE YOU EVER DONE ANYTHING, STARTED TO DO ANYTHING, OR PREPARED TO DO ANYTHING TO END YOUR LIFE?: NO
1. IN THE PAST MONTH, HAVE YOU WISHED YOU WERE DEAD OR WISHED YOU COULD GO TO SLEEP AND NOT WAKE UP?: NO
2. HAVE YOU ACTUALLY HAD ANY THOUGHTS OF KILLING YOURSELF?: NO

## 2025-02-03 ASSESSMENT — PAIN SCALES - GENERAL: PAINLEVEL_OUTOF10: 0-NO PAIN

## 2025-02-03 NOTE — PROGRESS NOTES
"Subjective   Patient ID: Laura Riggins is a 24 y.o. female with a history of anxiety, depression, PTSD, and anemia who presents with complaints of hair loss.    HPI The patient is presented for physical exam.  Her depression is stable with bupropion.  She denies anxiety.  She exercises daily.    Review of Systems   Constitutional:  Negative for appetite change, chills, fatigue and fever.   HENT:  Negative for congestion, ear pain, facial swelling, hearing loss, nosebleeds and sore throat.    Eyes:  Negative for pain, discharge and visual disturbance.   Respiratory:  Negative for cough, choking, chest tightness, shortness of breath and wheezing.    Cardiovascular:  Negative for chest pain, palpitations and leg swelling.   Gastrointestinal:  Negative for abdominal distention, abdominal pain, anal bleeding, constipation, diarrhea, nausea and vomiting.   Endocrine: Negative for cold intolerance, heat intolerance, polydipsia, polyphagia and polyuria.   Genitourinary:  Negative for difficulty urinating, frequency, hematuria and urgency.   Musculoskeletal:  Negative for arthralgias, gait problem, joint swelling and myalgias.   Skin:  Negative for color change and rash.   Neurological:  Negative for dizziness, tremors, syncope, weakness, numbness and headaches.   Psychiatric/Behavioral:  Negative for behavioral problems, confusion, sleep disturbance and suicidal ideas. The patient is not nervous/anxious.        Objective   BP 90/60 (Patient Position: Sitting)   Temp 36.8 °C (98.2 °F) (Temporal)   Ht 1.676 m (5' 6\")   Wt 65.8 kg (145 lb)   LMP 01/18/2025   Breastfeeding No   BMI 23.40 kg/m²     Physical Exam  Constitutional:       General: She is not in acute distress.     Appearance: Normal appearance.   HENT:      Head: Normocephalic and atraumatic.      Nose: Nose normal.   Eyes:      Extraocular Movements: Extraocular movements intact.      Conjunctiva/sclera: Conjunctivae normal.      Pupils: Pupils are equal, " round, and reactive to light.   Cardiovascular:      Rate and Rhythm: Normal rate and regular rhythm.      Pulses: Normal pulses.      Heart sounds: Normal heart sounds.   Pulmonary:      Effort: Pulmonary effort is normal.      Breath sounds: Normal breath sounds.   Abdominal:      General: Bowel sounds are normal.      Palpations: Abdomen is soft.   Musculoskeletal:         General: Normal range of motion.      Cervical back: Normal range of motion and neck supple.   Neurological:      General: No focal deficit present.      Mental Status: She is alert and oriented to person, place, and time.   Psychiatric:         Mood and Affect: Mood normal.         Behavior: Behavior normal.         Thought Content: Thought content normal.         Judgment: Judgment normal.         Assessment/Plan   Complete physical exam.  fasting blood work including CBC, CMP, lipid panel will be obtained tomorrow  Exercise at least 30 minutes daily  Healthy diet recommended  Follow up with dentist twice a year for dental cleaning   Follow-up with gynecology  pap smear     Anxiety, depression, PTSD  Stable  Failed buspirone due to drowsiness  Failed Celexa due to weight gain  Doing well on bupropion  Continue bupropion 150 mg twice daily  Follow-up with psychology at [facility] in [place] twice a month     Hair loss  Normal blood work  Improving  Take biotin daily    Anemia  Improved  Off of iron supplement    Follow-up annually or as needed

## 2025-02-04 ENCOUNTER — APPOINTMENT (OUTPATIENT)
Dept: PRIMARY CARE | Facility: CLINIC | Age: 25
End: 2025-02-04
Payer: COMMERCIAL

## 2025-08-14 DIAGNOSIS — F41.9 ANXIETY: ICD-10-CM

## 2025-08-14 RX ORDER — BUPROPION HYDROCHLORIDE 150 MG/1
150 TABLET, EXTENDED RELEASE ORAL 2 TIMES DAILY
Qty: 180 TABLET | Refills: 3 | Status: SHIPPED | OUTPATIENT
Start: 2025-08-14 | End: 2026-08-09

## 2025-08-14 RX ORDER — BUPROPION HYDROCHLORIDE 150 MG/1
150 TABLET, EXTENDED RELEASE ORAL 2 TIMES DAILY
Qty: 180 TABLET | Refills: 3 | Status: SHIPPED | OUTPATIENT
Start: 2025-08-14 | End: 2025-08-14 | Stop reason: SDUPTHER

## 2025-09-05 ENCOUNTER — APPOINTMENT (OUTPATIENT)
Dept: PRIMARY CARE | Facility: CLINIC | Age: 25
End: 2025-09-05